# Patient Record
(demographics unavailable — no encounter records)

---

## 2025-04-22 NOTE — HISTORY OF PRESENT ILLNESS
[FreeTextEntry1] : Lia is a 29-year-old female 32 weeks gestation who has been having near syncope. Since February she has had SOB and syncope. Dizzy at least once or twice a week. Three times went down. She was hospitalized two weeks ago. Her BP was very elevated. Baby is fine. Last year before pregnancy was palpitations and diagnosed with PVCs. Two hours after meal, sitting at desk and normally no dizziness when gets up,felt heart racing then dizzy then tries to lower herself to the floor and starts to see spots so lays on the floor and then starts to panic. Always starts with palpitations. Now working from home. One episode when standing at a bridal shower. Now hydrating more with electrolytes

## 2025-04-22 NOTE — DISCUSSION/SUMMARY
[FreeTextEntry1] : The patient is a 29-year-old female Hashimoto, PCOS, beta thalassemia, PVCs, 32 weeks 1st pregnancy with palpitations and near syncope.  #1 CV- normal ECG, ECHO normal mild MR #2 PVC- none seen but live event monitor today, increase hydration with electrolytes #3 Hashimoto- normal labs #4 Thalassemia- stable  #5 Ob- 32 weeks, c/w prenatal, all questions answered. [EKG obtained to assist in diagnosis and management of assessed problem(s)] : EKG obtained to assist in diagnosis and management of assessed problem(s)

## 2025-04-22 NOTE — PHYSICAL EXAM

## 2025-07-29 NOTE — FAMILY HISTORY
- this has been ongoing since April per Neurology and is more progressive and possibly more acute d/t recent infarcts   -they will f/u as an outpatient      [FreeTextEntry1] : Lia's mother is being treated for a liver condition and her maternal grandfather has Churg Curly Vasculitis x 20 years. Lia's father has atrial fibrillation and required an ablation.  Her paternal grandfather is  due to an aortic rupture and her paternal grandmother is  due to a heart attack.  Lia's brother and mother also need to eat frequent meals.

## 2025-07-29 NOTE — REASON FOR VISIT
[Initial - Scheduled] : [unfilled]  is being seen for  ~M an initial scheduled visit [FreeTextEntry3] : Ms. Teran is presenting for a result a low level for carnitine free and total and low urinary carnitine.

## 2025-07-29 NOTE — PLAN
[TextEntry] : 1. Blood work to be ordered and the script will be emailed to Ms. Teran (email confirmed during this visit) 2. Sebastian Teran (3/4/1998)- Lia's brother (lab work) 3. Follow-up based on lab work results

## 2025-07-29 NOTE — HISTORY OF PRESENT ILLNESS
[FreeTextEntry1] : Lia is a 29-year-old female who has a history of PVC's, Syncope, Beta thalassemia and Hashimoto's thyroiditis.  She is presenting with a low free and total carnitine level and low urinary carnitine.   Current: Lia had episodes of near syncope while pregnant. She was hospitalized during her pregnancy with SOB, dizziness and elevated BP.   Prior to the pregnancy she had exhibited palpitations and was diagnosed with PVC's.  After delivery of her infant, Audrey Dickey, it was noted that Audrey had an abnormal  screen for FAOD.  Blood work was completed on Audrey and her mother (carnitine free and total and urinary carnitine level.)   As per Lia: As a child she participated in dance classes without any issues of muscle cramps or muscle pain.  She is currently caring for her 6-week-old  and states that she has very low energy.  She considers herself a person with low energy and required naps during her day before pregnancy.  She has a varied diet including meat, vegetables and carbohydrates.  She feels the need to eat frequently (every 2 hours) and will feel dizzy or nauseated if she cannot eat frequently.  She does not have documented hypoglycemia on any lab tests.  She denies kidney disease, liver disease but was diagnosed with IBS a few years ago (no medication at this time).  She is on Sertraline currently and as of 7/15/2025 she started levocarnitine as ordered.  Lia's mother is being treated for a liver condition (Lia is unaware of the name of the disorder) and her maternal grandfather has Churg Curly Vasculitis x 20 years. Lia's father has atrial fibrillation and required an ablation.  Her paternal grandfather is  due to an aortic rupture and her paternal grandmother is  due to a heart ailment.  Lia's brother and mother also need to eat frequent meals.  Medication: Levocarnitine 500 mg oral capsule: 2 capsules 3 times daily Sertraline  mg oral tablet: 1.5 tabs daily  Lab Values: Carnitine Free, Serum: 9 umol/L (ref range 20-55) Carnitine Total, Serum: 17 umol/L (ref range 27-73)  Carnitine, Random, Urine: Total 105 nmol/mg Cr (Low Reference Value 180?412) Free (FC)11 nmol/mg Cr (Low Reference Value 77?214) AC/FC Ratio 8.5 High- Reference Value 0.7?3.4  Specialist: Cardiology (Dr. Rashid)- 2025- Normal echo, ECG and mild MR (as per cardiology note).  Hashimoto labs reported as normal and Thalassemia reported as stable.  Dr. Crowley encouraged questions and allowed ample time for answers.

## 2025-07-29 NOTE — FAMILY HISTORY
[FreeTextEntry1] : Lia's mother is being treated for a liver condition and her maternal grandfather has Churg Curly Vasculitis x 20 years. Lia's father has atrial fibrillation and required an ablation.  Her paternal grandfather is  due to an aortic rupture and her paternal grandmother is  due to a heart attack.  Lia's brother and mother also need to eat frequent meals.